# Patient Record
Sex: FEMALE | Race: WHITE | NOT HISPANIC OR LATINO | Employment: FULL TIME | URBAN - METROPOLITAN AREA
[De-identification: names, ages, dates, MRNs, and addresses within clinical notes are randomized per-mention and may not be internally consistent; named-entity substitution may affect disease eponyms.]

---

## 2017-01-09 ENCOUNTER — OFFICE VISIT (OUTPATIENT)
Dept: FAMILY MEDICINE CLINIC | Facility: CLINIC | Age: 32
End: 2017-01-09

## 2017-01-09 VITALS
SYSTOLIC BLOOD PRESSURE: 138 MMHG | HEART RATE: 81 BPM | HEIGHT: 66 IN | WEIGHT: 214.3 LBS | DIASTOLIC BLOOD PRESSURE: 88 MMHG | BODY MASS INDEX: 34.44 KG/M2 | OXYGEN SATURATION: 98 % | RESPIRATION RATE: 18 BRPM

## 2017-01-09 DIAGNOSIS — F32.A ANXIETY AND DEPRESSION: ICD-10-CM

## 2017-01-09 DIAGNOSIS — F41.9 ANXIETY AND DEPRESSION: ICD-10-CM

## 2017-01-09 DIAGNOSIS — Z00.00 ROUTINE PHYSICAL EXAMINATION: Primary | ICD-10-CM

## 2017-01-09 PROCEDURE — 99385 PREV VISIT NEW AGE 18-39: CPT | Performed by: NURSE PRACTITIONER

## 2017-01-09 RX ORDER — METHYLDOPA 500 MG/1
500 TABLET, FILM COATED ORAL 3 TIMES DAILY
COMMUNITY
End: 2017-01-09 | Stop reason: SDUPTHER

## 2017-01-09 RX ORDER — CITALOPRAM 10 MG/1
10 TABLET ORAL DAILY
Qty: 30 TABLET | Refills: 3 | Status: SHIPPED | OUTPATIENT
Start: 2017-01-09 | End: 2017-05-04 | Stop reason: SDUPTHER

## 2017-01-09 RX ORDER — BUPROPION HYDROCHLORIDE 100 MG/1
100 TABLET ORAL 2 TIMES DAILY
Qty: 60 TABLET | Refills: 3 | Status: SHIPPED | OUTPATIENT
Start: 2017-01-09 | End: 2017-05-15 | Stop reason: SDUPTHER

## 2017-01-09 RX ORDER — METHYLDOPA 500 MG/1
500 TABLET, FILM COATED ORAL 3 TIMES DAILY
Qty: 60 TABLET | Refills: 3 | Status: SHIPPED | OUTPATIENT
Start: 2017-01-09 | End: 2017-08-02

## 2017-01-09 RX ORDER — CITALOPRAM 10 MG/1
TABLET ORAL
COMMUNITY
End: 2017-01-09 | Stop reason: SDUPTHER

## 2017-01-09 RX ORDER — ACETAMINOPHEN AND CODEINE PHOSPHATE 120; 12 MG/5ML; MG/5ML
1 SOLUTION ORAL DAILY
Qty: 28 TABLET | Refills: 6 | Status: SHIPPED | OUTPATIENT
Start: 2017-01-09 | End: 2017-08-23 | Stop reason: SDUPTHER

## 2017-01-09 NOTE — MR AVS SNAPSHOT
Jen Chaidez   1/9/2017 3:40 PM   Office Visit    Provider:  MAJO Armstrong   Department:  [facility] PRIMARY CARE   Dept Phone:  [phone]                Your Full Care Plan              Today's Medication Changes          These changes are accurate as of: 1/9/17  4:31 PM.  If you have any questions, ask your nurse or doctor.               New Medication(s)Ordered:     buPROPion 100 MG tablet   Commonly known as:  WELLBUTRIN   Take 1 tablet by mouth 2 (Two) Times a Day.   Replaces:  BUPROPION HCL ER (XL) PO   Started by:  MAJO Armstrong         Medication(s)that have changed:     citalopram 10 MG tablet   Commonly known as:  CeleXA   Take 1 tablet by mouth Daily.   What changed:    - how much to take  - when to take this   Changed by:  MAJO Armstrong       norethindrone 0.35 MG tablet   Commonly known as:  JOLIVETTE   Take 1 tablet by mouth Daily.   What changed:    - medication strength  - how much to take  - when to take this   Changed by:  MAJO Armstrong         Stop taking medication(s)listed here:     BUPROPION HCL ER (XL) PO   Replaced by:  buPROPion 100 MG tablet   Stopped by:  MAJO Armstrong                Where to Get Your Medications      These medications were sent to [id number] - [place], [place] - [address] [phone]  - [phone]   [address], [place] [address]     Phone:  [phone]     buPROPion 100 MG tablet    citalopram 10 MG tablet    methyldopa 500 MG tablet    norethindrone 0.35 MG tablet                  Your Updated Medication List          This list is accurate as of: 1/9/17  4:31 PM.  Always use your most recent med list.                buPROPion 100 MG tablet   Commonly known as:  WELLBUTRIN   Take 1 tablet by mouth 2 (Two) Times a Day.       citalopram 10 MG tablet   Commonly known as:  CeleXA   Take 1 tablet by mouth Daily.       methyldopa 500 MG tablet   Commonly known  "as:  ALDOMET   Take 1 tablet by mouth 3 (Three) Times a Day.       norethindrone 0.35 MG tablet   Commonly known as:  JOLIVETTE   Take 1 tablet by mouth Daily.               We Performed the Following     Comprehensive Metabolic Panel     Hemoglobin A1c     Lipid Panel With / Chol / HDL Ratio     TSH       You Were Diagnosed With        Codes Comments    Routine physical examination    -  Primary ICD-10-CM: Z00.00  ICD-9-CM: V70.0       Instructions     None    Patient Instructions History      [employer]ame[employer] Signup     [facility] [employer] allows you to send messages to your doctor, view your test results, renew your prescriptions, schedule appointments, and more. To sign up, go to [employer] and click on the Sign Up Now link in the New User? box. Enter your [employer] Activation Code exactly as it appears below along with the last four digits of your Social Security Number and your Date of Birth () to complete the sign-up process. If you do not sign up before the expiration date, you must request a new code.    [employer] Activation Code: [email or web address]  Expires: 2017  4:28 PM    If you have questions, you can email [email or web address] or call [phone] to talk to our [employer] staff. Remember, [employer] is NOT to be used for urgent needs. For medical emergencies, dial 911.               Other Info from Your Visit           Allergies     Sulfa Antibiotics  Other (See Comments)    From childhood      Reason for Visit     Establish Care     Med Refill anxiety medication    Nasal Congestion     Earache           Vital Signs     Blood Pressure Pulse Respirations Height Weight Last Menstrual Period    138/88 81 18 66\" (167.6 cm) 214 lb 4.8 oz (97.2 kg) 2017    Oxygen Saturation Breastfeeding? Body Mass Index Smoking Status          98% Yes 34.59 kg/m2 Never Smoker        Problems and Diagnoses Noted     Routine medical exam    -  Primary      "

## 2017-01-09 NOTE — PROGRESS NOTES
"Zahra Chaidez is a 31 y.o. female.   PMHX: anxiety and depression, HTN  PSHX: pe tubes  PFHX: HTN mother  Diabetes: mother  Cholesterol None  Cad:None  Works at [employer]   has a 5 months old  From [place]    Chief Complaint   Patient presents with   • Establish Care   • Med Refill     anxiety medication   • Nasal Congestion   • Earache     Social History   Substance Use Topics   • Smoking status: Never Smoker   • Smokeless tobacco: Never Used   • Alcohol use 6.0 oz/week     10 Cans of beer per week       History of Present Illness     The following portions of the patient's history were reviewed and updated as appropriate: allergies, current medications, past social history and problem list.  Review of Systems   HENT: Positive for congestion, ear pain and sinus pressure.    Skin:        States hair is falling out.    Psychiatric/Behavioral:        Anxiety   All other systems reviewed and are negative.      Objective   Vitals:    01/09/17 1546   BP: 138/88   Pulse: 81   Resp: 18   SpO2: 98%   Weight: 214 lb 4.8 oz (97.2 kg)   Height: 66\" (167.6 cm)     Body mass index is 34.59 kg/(m^2).    Physical Exam   Constitutional: She appears well-developed and well-nourished. No distress.   HENT:   Head: Normocephalic and atraumatic.   Right Ear: External ear normal.   Left Ear: External ear normal.   Eyes: EOM are normal.   Neck: Neck supple. No thyromegaly present.   Cardiovascular: Normal rate, regular rhythm and normal heart sounds.    Pulmonary/Chest: Effort normal and breath sounds normal.   Musculoskeletal: Normal range of motion.   Neurological: She is alert.   Skin: Skin is warm.   Nursing note and vitals reviewed.      Assessment/Plan   Problem List Items Addressed This Visit     None      Visit Diagnoses     Routine physical examination    -  Primary    Relevant Orders    Comprehensive Metabolic Panel (Completed)    Lipid Panel With / Chol / HDL Ratio (Completed)    TSH (Completed)    Hemoglobin " A1c (Completed)    Anxiety and depression        Relevant Medications    buPROPion (WELLBUTRIN) 100 MG tablet    citalopram (CeleXA) 10 MG tablet         Gave her Dina Valles's phone number will call when she get time.

## 2017-01-10 DIAGNOSIS — R89.9 ABNORMAL LABORATORY TEST RESULT: Primary | ICD-10-CM

## 2017-01-10 LAB
ALBUMIN SERPL-MCNC: 4.2 G/DL (ref 3.5–5.5)
ALBUMIN/GLOB SERPL: 1.7 {RATIO} (ref 1.1–2.5)
ALP SERPL-CCNC: 63 IU/L (ref 39–117)
ALT SERPL-CCNC: 17 IU/L (ref 0–32)
AST SERPL-CCNC: 9 IU/L (ref 0–40)
BILIRUB SERPL-MCNC: <0.2 MG/DL (ref 0–1.2)
BUN SERPL-MCNC: 17 MG/DL (ref 6–20)
BUN/CREAT SERPL: 27 (ref 8–20)
CALCIUM SERPL-MCNC: 9.2 MG/DL (ref 8.7–10.2)
CHLORIDE SERPL-SCNC: 103 MMOL/L (ref 96–106)
CHOLEST SERPL-MCNC: 141 MG/DL (ref 100–199)
CHOLEST/HDLC SERPL: 3.4 RATIO UNITS (ref 0–4.4)
CO2 SERPL-SCNC: 22 MMOL/L (ref 18–29)
CREAT SERPL-MCNC: 0.64 MG/DL (ref 0.57–1)
GLOBULIN SER CALC-MCNC: 2.5 G/DL (ref 1.5–4.5)
GLUCOSE SERPL-MCNC: 90 MG/DL (ref 65–99)
HBA1C MFR BLD: 5.7 % (ref 4.8–5.6)
HDLC SERPL-MCNC: 41 MG/DL
LDLC SERPL CALC-MCNC: 78 MG/DL (ref 0–99)
POTASSIUM SERPL-SCNC: 4.7 MMOL/L (ref 3.5–5.2)
PROT SERPL-MCNC: 6.7 G/DL (ref 6–8.5)
SODIUM SERPL-SCNC: 142 MMOL/L (ref 134–144)
TRIGL SERPL-MCNC: 110 MG/DL (ref 0–149)
TSH SERPL DL<=0.005 MIU/L-ACNC: 2.37 UIU/ML (ref 0.45–4.5)
VLDLC SERPL CALC-MCNC: 22 MG/DL (ref 5–40)

## 2017-01-11 DIAGNOSIS — R89.9 ABNORMAL LABORATORY TEST RESULT: ICD-10-CM

## 2017-02-15 ENCOUNTER — TELEPHONE (OUTPATIENT)
Dept: FAMILY MEDICINE CLINIC | Facility: CLINIC | Age: 32
End: 2017-02-15

## 2017-02-15 NOTE — TELEPHONE ENCOUNTER
----- Message from MAJO Armstrong sent at 2/15/2017  9:24 AM EST -----  Regarding: RE: LABS  Contact: [phone]  Thanks for the help  ----- Message -----     From: Roro Serrano CMA     Sent: 2/15/2017   9:07 AM       To: MAJO Armstrong  Subject: LABS                                             Pt called and stated that she needs to get labs done at [facility]. States she was hoping to have the orders today so her  could pick them up. Do you know anything about this?

## 2017-03-10 ENCOUNTER — TELEPHONE (OUTPATIENT)
Dept: FAMILY MEDICINE CLINIC | Facility: CLINIC | Age: 32
End: 2017-03-10

## 2017-03-10 NOTE — TELEPHONE ENCOUNTER
Pt called stating that she wanted the lab results that [facility]. I looked in our chart there are some labs from 1/9/2017. Left VM for pt to call the office.

## 2017-03-31 ENCOUNTER — TELEPHONE (OUTPATIENT)
Dept: FAMILY MEDICINE CLINIC | Facility: CLINIC | Age: 32
End: 2017-03-31

## 2017-03-31 NOTE — TELEPHONE ENCOUNTER
Pt called and was inquiring about labs that were done in [place] instructed pt that we do not have the results. Pt is calling and having them faxed to us.

## 2017-03-31 NOTE — TELEPHONE ENCOUNTER
Called and left a message on verified VM letting pt know that we received her labs, they have been scanned into [employer] and let pt know we would call her to discuss results Monday when Rachell is in office.

## 2017-04-07 ENCOUNTER — OFFICE VISIT (OUTPATIENT)
Dept: FAMILY MEDICINE CLINIC | Facility: CLINIC | Age: 32
End: 2017-04-07

## 2017-04-07 VITALS
DIASTOLIC BLOOD PRESSURE: 94 MMHG | RESPIRATION RATE: 16 BRPM | SYSTOLIC BLOOD PRESSURE: 144 MMHG | HEIGHT: 66 IN | OXYGEN SATURATION: 98 % | HEART RATE: 72 BPM | BODY MASS INDEX: 34.07 KG/M2 | WEIGHT: 212 LBS | TEMPERATURE: 99.1 F

## 2017-04-07 DIAGNOSIS — R10.9 FLANK PAIN: Primary | ICD-10-CM

## 2017-04-07 DIAGNOSIS — R10.9 BILATERAL FLANK PAIN: Primary | ICD-10-CM

## 2017-04-07 DIAGNOSIS — R10.30 LOWER ABDOMINAL PAIN: ICD-10-CM

## 2017-04-07 LAB
BILIRUB BLD-MCNC: NEGATIVE MG/DL
CLARITY, POC: CLEAR
COLOR UR: YELLOW
GLUCOSE UR STRIP-MCNC: NEGATIVE MG/DL
KETONES UR QL: NEGATIVE
LEUKOCYTE EST, POC: NEGATIVE
NITRITE UR-MCNC: NEGATIVE MG/ML
PH UR: 6 [PH] (ref 5–8)
PROT UR STRIP-MCNC: NEGATIVE MG/DL
RBC # UR STRIP: NEGATIVE /UL
SP GR UR: 1 (ref 1–1.03)
UROBILINOGEN UR QL: NORMAL

## 2017-04-07 PROCEDURE — 99213 OFFICE O/P EST LOW 20 MIN: CPT | Performed by: NURSE PRACTITIONER

## 2017-04-07 PROCEDURE — 81002 URINALYSIS NONAUTO W/O SCOPE: CPT | Performed by: NURSE PRACTITIONER

## 2017-04-08 LAB
ALBUMIN/CREAT UR: <3.4 MG/G CREAT (ref 0–30)
CREAT UR-MCNC: 88.5 MG/DL
MICROALBUMIN UR-MCNC: <3 UG/ML

## 2017-04-13 ENCOUNTER — TELEPHONE (OUTPATIENT)
Dept: FAMILY MEDICINE CLINIC | Facility: CLINIC | Age: 32
End: 2017-04-13

## 2017-04-13 NOTE — TELEPHONE ENCOUNTER
Pt called and left a VM stating that she had a CT done last week and has not received the results.

## 2017-04-17 NOTE — TELEPHONE ENCOUNTER
Would you please look into this for me Pt called stated she had her CT done and has not received results. Please advise.

## 2017-04-18 NOTE — TELEPHONE ENCOUNTER
Let her know that they read the ct as some enlarged nodes that were nonspecific but he is requesting a ct with the use of oral and IV contrast for a better picture, see if she is ok with this and if so have Dr Davis order it if I am not here

## 2017-04-19 DIAGNOSIS — R59.0 LYMPHADENOPATHY, ABDOMINAL: Primary | ICD-10-CM

## 2017-04-19 NOTE — TELEPHONE ENCOUNTER
Called and informed pt of results. Pt verbalized understanding. Would you please put in the order for a follow-up CT with use of Contrast as requested per MAJO Lovett.

## 2017-04-28 ENCOUNTER — TELEPHONE (OUTPATIENT)
Dept: FAMILY MEDICINE CLINIC | Facility: CLINIC | Age: 32
End: 2017-04-28

## 2017-04-28 NOTE — TELEPHONE ENCOUNTER
----- Message from Bertha Hicks sent at 4/27/2017  1:32 PM EDT -----  Spoke to patient regarding ct results

## 2017-05-04 ENCOUNTER — OFFICE VISIT (OUTPATIENT)
Dept: FAMILY MEDICINE CLINIC | Facility: CLINIC | Age: 32
End: 2017-05-04

## 2017-05-04 VITALS
OXYGEN SATURATION: 97 % | DIASTOLIC BLOOD PRESSURE: 90 MMHG | WEIGHT: 210.5 LBS | HEART RATE: 88 BPM | BODY MASS INDEX: 33.83 KG/M2 | SYSTOLIC BLOOD PRESSURE: 138 MMHG | HEIGHT: 66 IN

## 2017-05-04 DIAGNOSIS — I10 ESSENTIAL HYPERTENSION: ICD-10-CM

## 2017-05-04 DIAGNOSIS — F41.9 ANXIETY: ICD-10-CM

## 2017-05-04 DIAGNOSIS — K29.60 REFLUX GASTRITIS: Primary | ICD-10-CM

## 2017-05-04 PROCEDURE — 99214 OFFICE O/P EST MOD 30 MIN: CPT | Performed by: NURSE PRACTITIONER

## 2017-05-04 RX ORDER — LISINOPRIL 10 MG/1
10 TABLET ORAL DAILY
Qty: 30 TABLET | Refills: 1 | Status: SHIPPED | OUTPATIENT
Start: 2017-05-04 | End: 2017-06-20 | Stop reason: SDUPTHER

## 2017-05-04 RX ORDER — CITALOPRAM 10 MG/1
10 TABLET ORAL DAILY
Qty: 90 TABLET | Refills: 3 | Status: SHIPPED | OUTPATIENT
Start: 2017-05-04

## 2017-05-15 ENCOUNTER — TELEPHONE (OUTPATIENT)
Dept: FAMILY MEDICINE CLINIC | Facility: CLINIC | Age: 32
End: 2017-05-15

## 2017-05-15 RX ORDER — BUPROPION HYDROCHLORIDE 100 MG/1
100 TABLET ORAL 2 TIMES DAILY
Qty: 60 TABLET | Refills: 3 | Status: SHIPPED | OUTPATIENT
Start: 2017-05-15 | End: 2017-10-06 | Stop reason: SDUPTHER

## 2017-06-20 RX ORDER — LISINOPRIL 10 MG/1
TABLET ORAL
Qty: 30 TABLET | Refills: 0 | Status: SHIPPED | OUTPATIENT
Start: 2017-06-20 | End: 2017-08-02 | Stop reason: SDUPTHER

## 2017-06-21 ENCOUNTER — OFFICE VISIT (OUTPATIENT)
Dept: GASTROENTEROLOGY | Facility: CLINIC | Age: 32
End: 2017-06-21

## 2017-06-21 VITALS
TEMPERATURE: 99.2 F | HEIGHT: 66 IN | DIASTOLIC BLOOD PRESSURE: 74 MMHG | BODY MASS INDEX: 33.4 KG/M2 | SYSTOLIC BLOOD PRESSURE: 118 MMHG | WEIGHT: 207.8 LBS

## 2017-06-21 DIAGNOSIS — R10.13 DYSPEPSIA: ICD-10-CM

## 2017-06-21 DIAGNOSIS — R93.89 ABNORMAL CT SCAN: Primary | ICD-10-CM

## 2017-06-21 PROCEDURE — 99203 OFFICE O/P NEW LOW 30 MIN: CPT | Performed by: INTERNAL MEDICINE

## 2017-06-21 NOTE — PROGRESS NOTES
Chief Complaint   Patient presents with   • Heartburn     Jen Chaidez is a 32 y.o. female who presents with Dyspepsia and abnormal CT.  She reports having heartburn since the birth of her child 11 months ago.  This happened with anything that she ate.  She's never had any problems with her from before.  She did not have problems during her pregnancy.  The symptoms of actually resolved with the course of last month after she was started on lisinopril and her methyldopa was discontinued.  She is now eating normally again.  She has not had any symptoms of heartburn that would be atypical for her.  These instances are rare.  She denies abdominal pain, nausea, changes in her bowel habits or blood in her stool.  She has lost about 4 pounds in the past month.    She had a noncontrast CT as part of the workup for these symptoms.  Initially there was some question of duodenal and jejunal thickening as well as numerous not pathologically enlarged lymph nodes seen throughout the abdomen.  Contrasted CT failed to show any small bowel abnormality but again demonstrated multiple lymph nodes that were not pathologically enlarged.  She denies any fevers or chills or night sweats.     HPI  Past Medical History:   Diagnosis Date   • Anxiety    • Depression    • Hypertension      Past Surgical History:   Procedure Laterality Date   • EAR TUBES     • WISDOM TOOTH EXTRACTION         Current Outpatient Prescriptions:   •  buPROPion (WELLBUTRIN) 100 MG tablet, Take 1 tablet by mouth 2 (Two) Times a Day., Disp: 60 tablet, Rfl: 3  •  citalopram (CeleXA) 10 MG tablet, Take 1 tablet by mouth Daily., Disp: 90 tablet, Rfl: 3  •  lisinopril (PRINIVIL,ZESTRIL) 10 MG tablet, TAKE ONE TABLET BY MOUTH ONE TIME DAILY , Disp: 30 tablet, Rfl: 0  •  norethindrone (JOLIVETTE) 0.35 MG tablet, Take 1 tablet by mouth Daily., Disp: 28 tablet, Rfl: 6  •  methyldopa (ALDOMET) 500 MG tablet, Take 1 tablet by mouth 3 (Three) Times a Day., Disp: 60 tablet, Rfl:  3  Allergies   Allergen Reactions   • Sulfa Antibiotics Other (See Comments)     From childhood     Social History     Social History   • Marital status:      Spouse name: N/A   • Number of children: 1   • Years of education: N/A     Occupational History   • works at [employer]      Social History Main Topics   • Smoking status: Never Smoker   • Smokeless tobacco: Never Used   • Alcohol use 6.0 oz/week     10 Cans of beer per week   • Drug use: No   • Sexual activity: Defer     Other Topics Concern   • Not on file     Social History Narrative     Family History   Problem Relation Age of Onset   • Diabetes Mother    • Hypertension Mother    • Anxiety disorder Mother    • Diverticulitis Father      Review of Systems   Constitutional: Negative for appetite change, chills, fever and unexpected weight change.   Gastrointestinal: Negative for abdominal pain, blood in stool, constipation and diarrhea.   All other systems reviewed and are negative.    Vitals:    06/21/17 1444   BP: 118/74   Temp: 99.2 °F (37.3 °C)     Last Weight    06/21/17  1444   Weight: 207 lb 12.8 oz (94.3 kg)     Physical Exam   Constitutional: She appears well-developed and well-nourished.   HENT:   Head: Normocephalic and atraumatic.   Eyes: No scleral icterus.   Abdominal: Soft. She exhibits no distension and no mass. There is no tenderness.   Neurological: She is alert.   Skin: Skin is warm and dry.   Psychiatric: She has a normal mood and affect.     No images are attached to the encounter.  No notes on file  Jen was seen today for heartburn.    Diagnoses and all orders for this visit:    Abnormal CT scan  -     CT Abdomen Pelvis With Contrast; Future  -     CBC & Differential    Dyspepsia      Plan-  Her symptoms happen at this point resolved with a change in her blood pressure medicines.  I have encouraged her to call me if his symptoms recur and we would pursue an EGD.  Going to check a CBC today given her CT findings that do not have  any baseline of record.  Recommended a repeat CT in 6 months which will be October as her previous CT was done in April to follow up on the abnormality seen on her CT which may very well have been reactive to something going on at that time.  Of encouraged to call me if her symptoms recur or worsen in the antrum.  Would hold off on medication given absence of symptoms.

## 2017-06-22 ENCOUNTER — TELEPHONE (OUTPATIENT)
Dept: GASTROENTEROLOGY | Facility: CLINIC | Age: 32
End: 2017-06-22

## 2017-06-22 LAB
BASOPHILS # BLD AUTO: 0.01 10*3/MM3 (ref 0–0.2)
BASOPHILS NFR BLD AUTO: 0.1 % (ref 0–1.5)
EOSINOPHIL # BLD AUTO: 0.15 10*3/MM3 (ref 0–0.7)
EOSINOPHIL NFR BLD AUTO: 1.9 % (ref 0.3–6.2)
ERYTHROCYTE [DISTWIDTH] IN BLOOD BY AUTOMATED COUNT: 13.2 % (ref 11.7–13)
HCT VFR BLD AUTO: 38.5 % (ref 35.6–45.5)
HGB BLD-MCNC: 12.6 G/DL (ref 11.9–15.5)
IMM GRANULOCYTES # BLD: 0.02 10*3/MM3 (ref 0–0.03)
IMM GRANULOCYTES NFR BLD: 0.2 % (ref 0–0.5)
LYMPHOCYTES # BLD AUTO: 3.15 10*3/MM3 (ref 0.9–4.8)
LYMPHOCYTES NFR BLD AUTO: 39.2 % (ref 19.6–45.3)
MCH RBC QN AUTO: 27.8 PG (ref 26.9–32)
MCHC RBC AUTO-ENTMCNC: 32.7 G/DL (ref 32.4–36.3)
MCV RBC AUTO: 85 FL (ref 80.5–98.2)
MONOCYTES # BLD AUTO: 0.62 10*3/MM3 (ref 0.2–1.2)
MONOCYTES NFR BLD AUTO: 7.7 % (ref 5–12)
NEUTROPHILS # BLD AUTO: 4.09 10*3/MM3 (ref 1.9–8.1)
NEUTROPHILS NFR BLD AUTO: 50.9 % (ref 42.7–76)
PLATELET # BLD AUTO: 261 10*3/MM3 (ref 140–500)
RBC # BLD AUTO: 4.53 10*6/MM3 (ref 3.9–5.2)
WBC # BLD AUTO: 8.04 10*3/MM3 (ref 4.5–10.7)

## 2017-06-22 NOTE — TELEPHONE ENCOUNTER
----- Message from Renee Aaron MD sent at 6/22/2017  8:48 AM EDT -----  Your recent labwork was normal.

## 2017-08-01 RX ORDER — LISINOPRIL 10 MG/1
TABLET ORAL
Qty: 30 TABLET | Refills: 0 | OUTPATIENT
Start: 2017-08-01

## 2017-08-02 ENCOUNTER — OFFICE VISIT (OUTPATIENT)
Dept: FAMILY MEDICINE CLINIC | Facility: CLINIC | Age: 32
End: 2017-08-02

## 2017-08-02 VITALS
SYSTOLIC BLOOD PRESSURE: 126 MMHG | HEIGHT: 66 IN | HEART RATE: 66 BPM | DIASTOLIC BLOOD PRESSURE: 82 MMHG | OXYGEN SATURATION: 100 % | WEIGHT: 208 LBS | BODY MASS INDEX: 33.43 KG/M2

## 2017-08-02 DIAGNOSIS — I10 ESSENTIAL HYPERTENSION: Primary | ICD-10-CM

## 2017-08-02 PROCEDURE — 99213 OFFICE O/P EST LOW 20 MIN: CPT | Performed by: NURSE PRACTITIONER

## 2017-08-02 RX ORDER — LISINOPRIL 10 MG/1
10 TABLET ORAL DAILY
Qty: 90 TABLET | Refills: 1 | Status: SHIPPED | OUTPATIENT
Start: 2017-08-02

## 2017-08-02 NOTE — PROGRESS NOTES
Jen Chaidez is a 32 y.o. female.  Seen 08/02/2017    Assessment/Plan   Problem List Items Addressed This Visit     None      Visit Diagnoses     Essential hypertension    -  Primary    Relevant Medications    lisinopril (PRINIVIL,ZESTRIL) 10 MG tablet             No Follow-up on file.  There are no Patient Instructions on file for this visit.    Subjective   Here for her hypertension, is stable on her medicine and needs a refill.  Chief Complaint   Patient presents with   • Hypertension   • Med Refill     Social History   Substance Use Topics   • Smoking status: Never Smoker   • Smokeless tobacco: Never Used   • Alcohol use 6.0 oz/week     10 Cans of beer per week       History of Present Illness     The following portions of the patient's history were reviewed and updated as appropriate:PMHroutine: Social history , Allergies, Current Medications and Active Problem List    Review of Systems   Constitutional: Negative for activity change, appetite change, chills, fatigue, fever and unexpected weight change.   HENT: Negative for congestion, ear pain, hearing loss, nosebleeds, rhinorrhea and sore throat.    Eyes: Negative for pain, redness and visual disturbance.   Respiratory: Negative for cough, shortness of breath and wheezing.    Cardiovascular: Negative for chest pain, palpitations and leg swelling.   Gastrointestinal: Negative for abdominal pain, blood in stool, constipation, diarrhea, nausea and vomiting.   Endocrine: Negative for cold intolerance and heat intolerance.   Genitourinary: Negative for difficulty urinating, dysuria, frequency, hematuria, pelvic pain, urgency and vaginal discharge.   Musculoskeletal: Negative for arthralgias, back pain and joint swelling.   Skin: Negative for rash and wound.   Neurological: Negative for dizziness, weakness, numbness and headaches.   Hematological: Does not bruise/bleed easily.   Psychiatric/Behavioral: Negative for dysphoric mood, sleep disturbance and suicidal  "ideas. The patient is not nervous/anxious.        Objective   Vitals:    08/02/17 1423   BP: 126/82   Pulse: 66   SpO2: 100%   Weight: 208 lb (94.3 kg)   Height: 66\" (167.6 cm)     Body mass index is 33.57 kg/(m^2).  Physical Exam   Constitutional: She appears well-developed and well-nourished. No distress.   HENT:   Head: Normocephalic and atraumatic.   Right Ear: External ear normal.   Left Ear: External ear normal.   Eyes: EOM are normal.   Neck: Neck supple. No thyromegaly present.   Cardiovascular: Normal rate, regular rhythm and normal heart sounds.    Pulmonary/Chest: Effort normal and breath sounds normal.   Musculoskeletal: Normal range of motion.   Neurological: She is alert.   Skin: Skin is warm.   Nursing note and vitals reviewed.    Reviewed Data:  No visits with results within 1 Month(s) from this visit.  Latest known visit with results is:    Office Visit on 06/21/2017   Component Date Value Ref Range Status   • WBC 06/21/2017 8.04  4.50 - 10.70 10*3/mm3 Final   • RBC 06/21/2017 4.53  3.90 - 5.20 10*6/mm3 Final   • Hemoglobin 06/21/2017 12.6  11.9 - 15.5 g/dL Final   • Hematocrit 06/21/2017 38.5  35.6 - 45.5 % Final   • MCV 06/21/2017 85.0  80.5 - 98.2 fL Final   • MCH 06/21/2017 27.8  26.9 - 32.0 pg Final   • MCHC 06/21/2017 32.7  32.4 - 36.3 g/dL Final   • RDW 06/21/2017 13.2* 11.7 - 13.0 % Final   • Platelets 06/21/2017 261  140 - 500 10*3/mm3 Final   • Neutrophil Rel % 06/21/2017 50.9  42.7 - 76.0 % Final   • Lymphocyte Rel % 06/21/2017 39.2  19.6 - 45.3 % Final   • Monocyte Rel % 06/21/2017 7.7  5.0 - 12.0 % Final   • Eosinophil Rel % 06/21/2017 1.9  0.3 - 6.2 % Final   • Basophil Rel % 06/21/2017 0.1  0.0 - 1.5 % Final   • Neutrophils Absolute 06/21/2017 4.09  1.90 - 8.10 10*3/mm3 Final   • Lymphocytes Absolute 06/21/2017 3.15  0.90 - 4.80 10*3/mm3 Final   • Monocytes Absolute 06/21/2017 0.62  0.20 - 1.20 10*3/mm3 Final   • Eosinophils Absolute 06/21/2017 0.15  0.00 - 0.70 10*3/mm3 Final   • " Basophils Absolute 06/21/2017 0.01  0.00 - 0.20 10*3/mm3 Final   • Immature Granulocyte Rel % 06/21/2017 0.2  0.0 - 0.5 % Final   • Immature Grans Absolute 06/21/2017 0.02  0.00 - 0.03 10*3/mm3 Final

## 2017-08-23 RX ORDER — ACETAMINOPHEN AND CODEINE PHOSPHATE 120; 12 MG/5ML; MG/5ML
1 SOLUTION ORAL DAILY
Qty: 28 TABLET | Refills: 6 | Status: SHIPPED | OUTPATIENT
Start: 2017-08-23 | End: 2018-03-10 | Stop reason: SDUPTHER

## 2017-10-05 ENCOUNTER — TELEPHONE (OUTPATIENT)
Dept: FAMILY MEDICINE CLINIC | Facility: CLINIC | Age: 32
End: 2017-10-05

## 2017-10-06 RX ORDER — BUPROPION HYDROCHLORIDE 100 MG/1
100 TABLET ORAL 2 TIMES DAILY
Qty: 60 TABLET | Refills: 0 | Status: SHIPPED | OUTPATIENT
Start: 2017-10-06

## 2018-03-12 RX ORDER — NORETHINDRONE 0.35 MG/1
TABLET ORAL
Qty: 28 TABLET | Refills: 11 | Status: SHIPPED | OUTPATIENT
Start: 2018-03-12